# Patient Record
Sex: FEMALE | URBAN - METROPOLITAN AREA
[De-identification: names, ages, dates, MRNs, and addresses within clinical notes are randomized per-mention and may not be internally consistent; named-entity substitution may affect disease eponyms.]

---

## 2020-08-26 ENCOUNTER — EMERGENCY (EMERGENCY)
Facility: HOSPITAL | Age: 40
LOS: 1 days | Discharge: HOME | End: 2020-08-26
Attending: EMERGENCY MEDICINE | Admitting: EMERGENCY MEDICINE
Payer: MEDICAID

## 2020-08-26 VITALS
DIASTOLIC BLOOD PRESSURE: 77 MMHG | TEMPERATURE: 98 F | HEART RATE: 95 BPM | OXYGEN SATURATION: 99 % | RESPIRATION RATE: 18 BRPM | SYSTOLIC BLOOD PRESSURE: 107 MMHG

## 2020-08-26 VITALS
DIASTOLIC BLOOD PRESSURE: 57 MMHG | HEART RATE: 92 BPM | OXYGEN SATURATION: 100 % | SYSTOLIC BLOOD PRESSURE: 105 MMHG | RESPIRATION RATE: 16 BRPM

## 2020-08-26 DIAGNOSIS — Z90.710 ACQUIRED ABSENCE OF BOTH CERVIX AND UTERUS: Chronic | ICD-10-CM

## 2020-08-26 LAB
ALBUMIN SERPL ELPH-MCNC: 4.4 G/DL — SIGNIFICANT CHANGE UP (ref 3.3–5)
ALP SERPL-CCNC: 105 U/L — SIGNIFICANT CHANGE UP (ref 40–120)
ALT FLD-CCNC: 16 U/L — SIGNIFICANT CHANGE UP (ref 4–33)
ANION GAP SERPL CALC-SCNC: 13 MMO/L — SIGNIFICANT CHANGE UP (ref 7–14)
APPEARANCE UR: SIGNIFICANT CHANGE UP
AST SERPL-CCNC: 14 U/L — SIGNIFICANT CHANGE UP (ref 4–32)
BACTERIA # UR AUTO: HIGH
BASOPHILS # BLD AUTO: 0.04 K/UL — SIGNIFICANT CHANGE UP (ref 0–0.2)
BASOPHILS NFR BLD AUTO: 0.3 % — SIGNIFICANT CHANGE UP (ref 0–2)
BILIRUB SERPL-MCNC: 0.6 MG/DL — SIGNIFICANT CHANGE UP (ref 0.2–1.2)
BILIRUB UR-MCNC: NEGATIVE — SIGNIFICANT CHANGE UP
BLOOD UR QL VISUAL: HIGH
BUN SERPL-MCNC: 14 MG/DL — SIGNIFICANT CHANGE UP (ref 7–23)
CALCIUM SERPL-MCNC: 9.3 MG/DL — SIGNIFICANT CHANGE UP (ref 8.4–10.5)
CHLORIDE SERPL-SCNC: 106 MMOL/L — SIGNIFICANT CHANGE UP (ref 98–107)
CO2 SERPL-SCNC: 21 MMOL/L — LOW (ref 22–31)
COLOR SPEC: YELLOW — SIGNIFICANT CHANGE UP
CREAT SERPL-MCNC: 0.88 MG/DL — SIGNIFICANT CHANGE UP (ref 0.5–1.3)
EOSINOPHIL # BLD AUTO: 0.16 K/UL — SIGNIFICANT CHANGE UP (ref 0–0.5)
EOSINOPHIL NFR BLD AUTO: 1.3 % — SIGNIFICANT CHANGE UP (ref 0–6)
EPI CELLS # UR: SIGNIFICANT CHANGE UP
GLUCOSE SERPL-MCNC: 88 MG/DL — SIGNIFICANT CHANGE UP (ref 70–99)
GLUCOSE UR-MCNC: NEGATIVE — SIGNIFICANT CHANGE UP
HCT VFR BLD CALC: 41.8 % — SIGNIFICANT CHANGE UP (ref 34.5–45)
HGB BLD-MCNC: 13.5 G/DL — SIGNIFICANT CHANGE UP (ref 11.5–15.5)
IMM GRANULOCYTES NFR BLD AUTO: 0.2 % — SIGNIFICANT CHANGE UP (ref 0–1.5)
KETONES UR-MCNC: NEGATIVE — SIGNIFICANT CHANGE UP
LEUKOCYTE ESTERASE UR-ACNC: HIGH
LYMPHOCYTES # BLD AUTO: 1.74 K/UL — SIGNIFICANT CHANGE UP (ref 1–3.3)
LYMPHOCYTES # BLD AUTO: 14.1 % — SIGNIFICANT CHANGE UP (ref 13–44)
MAGNESIUM SERPL-MCNC: 2.2 MG/DL — SIGNIFICANT CHANGE UP (ref 1.6–2.6)
MCHC RBC-ENTMCNC: 29.7 PG — SIGNIFICANT CHANGE UP (ref 27–34)
MCHC RBC-ENTMCNC: 32.3 % — SIGNIFICANT CHANGE UP (ref 32–36)
MCV RBC AUTO: 91.9 FL — SIGNIFICANT CHANGE UP (ref 80–100)
MONOCYTES # BLD AUTO: 0.82 K/UL — SIGNIFICANT CHANGE UP (ref 0–0.9)
MONOCYTES NFR BLD AUTO: 6.6 % — SIGNIFICANT CHANGE UP (ref 2–14)
MUCOUS THREADS # UR AUTO: SIGNIFICANT CHANGE UP
NEUTROPHILS # BLD AUTO: 9.56 K/UL — HIGH (ref 1.8–7.4)
NEUTROPHILS NFR BLD AUTO: 77.5 % — HIGH (ref 43–77)
NITRITE UR-MCNC: POSITIVE — SIGNIFICANT CHANGE UP
NRBC # FLD: 0 K/UL — SIGNIFICANT CHANGE UP (ref 0–0)
PH UR: 6 — SIGNIFICANT CHANGE UP (ref 5–8)
PHOSPHATE SERPL-MCNC: 3.4 MG/DL — SIGNIFICANT CHANGE UP (ref 2.5–4.5)
PLATELET # BLD AUTO: 241 K/UL — SIGNIFICANT CHANGE UP (ref 150–400)
PMV BLD: 11.2 FL — SIGNIFICANT CHANGE UP (ref 7–13)
POTASSIUM SERPL-MCNC: 3.8 MMOL/L — SIGNIFICANT CHANGE UP (ref 3.5–5.3)
POTASSIUM SERPL-SCNC: 3.8 MMOL/L — SIGNIFICANT CHANGE UP (ref 3.5–5.3)
PROT SERPL-MCNC: 8.1 G/DL — SIGNIFICANT CHANGE UP (ref 6–8.3)
PROT UR-MCNC: 100 — HIGH
RBC # BLD: 4.55 M/UL — SIGNIFICANT CHANGE UP (ref 3.8–5.2)
RBC # FLD: 12.6 % — SIGNIFICANT CHANGE UP (ref 10.3–14.5)
RBC CASTS # UR COMP ASSIST: >50 — HIGH (ref 0–?)
SODIUM SERPL-SCNC: 140 MMOL/L — SIGNIFICANT CHANGE UP (ref 135–145)
SP GR SPEC: 1.01 — SIGNIFICANT CHANGE UP (ref 1–1.04)
UROBILINOGEN FLD QL: NORMAL — SIGNIFICANT CHANGE UP
WBC # BLD: 12.35 K/UL — HIGH (ref 3.8–10.5)
WBC # FLD AUTO: 12.35 K/UL — HIGH (ref 3.8–10.5)
WBC UR QL: >50 — HIGH (ref 0–?)

## 2020-08-26 PROCEDURE — 99284 EMERGENCY DEPT VISIT MOD MDM: CPT

## 2020-08-26 RX ORDER — CEFTRIAXONE 500 MG/1
1000 INJECTION, POWDER, FOR SOLUTION INTRAMUSCULAR; INTRAVENOUS ONCE
Refills: 0 | Status: COMPLETED | OUTPATIENT
Start: 2020-08-26 | End: 2020-08-26

## 2020-08-26 RX ORDER — CEFPODOXIME PROXETIL 100 MG
1 TABLET ORAL
Qty: 14 | Refills: 0
Start: 2020-08-26 | End: 2020-09-01

## 2020-08-26 RX ADMIN — CEFTRIAXONE 100 MILLIGRAM(S): 500 INJECTION, POWDER, FOR SOLUTION INTRAMUSCULAR; INTRAVENOUS at 19:32

## 2020-08-26 NOTE — ED ADULT NURSE NOTE - OBJECTIVE STATEMENT
Received pt to intake bed 6, A+Ox3, ambulatory, Slovenian speaking,  at bedside for translation. C/O abdominal pain, dysuria starting 4 days ago. Respirations even and unlabored. Lung sounds clear with equal chest rise bilaterally. ABD is soft, tender, non distended with normal active bowel sounds. No complaints of chest pain, headache, nausea, dizziness, vomiting  SOB, fever, chills verbalized. Labs sent, will continue to monitor.

## 2020-08-26 NOTE — ED PROVIDER NOTE - ATTENDING CONTRIBUTION TO CARE
DR. BLOCH, ATTENDING MD-  I performed a face to face bedside interview with patient regarding history of present illness, review of symptoms and past medical history. I completed an independent physical exam.  I have discussed patient's plan of care with the resident.  Patient examined, well appearing NAD HEENT nml heart soundsnml lungs clear, abd soft ntender suprapubic and RLQ, LLQ no CVA tenderness, no edema

## 2020-08-26 NOTE — ED PROVIDER NOTE - CLINICAL SUMMARY MEDICAL DECISION MAKING FREE TEXT BOX
39F s/p hysterectomy 2015 appendectomy 2011 presents to ED for 4 days 6/10 worsening urinary burning + frequency, w/ abd pain, denies any blood in urine, vaginal bleeding, or vaginal discharge. Lower abd tenderness to palpation on physical exam, vitals WNL. Concern for UTI vs complicated UTI, less likely abdominal abscess, pyelonephritis, nephrolithiasis. Ordered CBC, CMP, UA, UC, POC Urine preg for Urinary/GYN concerns, will consider imaging if UA negative.

## 2020-08-26 NOTE — ED PROVIDER NOTE - OBJECTIVE STATEMENT
Faisal  #[id number]  39F s/p hysterectomy 2015 appendectomy 2011 presents to ED for 4 days worsening urinary burning + frequency, having to go every half hour Faisal  #[id number]  39F s/p hysterectomy 2015 appendectomy 2011 presents to ED for 4 days worsening urinary burning + frequency, having to go every half hour. Pt states she also has lower bilateral abdominal pain. Faisal  #[id number]  39F s/p hysterectomy 2015 appendectomy 2011 presents to ED for 4 days 6/10 worsening urinary burning + frequency, having to go every half hour. Pt denies any inviting events, she states her urine has been darker than usual, but denies any blood in urine, vaginal bleeding, or vaginal discharge. Pt states she also has lower bilateral abdominal pain over the past 2 days. Pt denies subjective fever/chills/N/V/D. Tylenol + Ibuprofen provided mild pain relief. She states she has an appointment with her OBGYN tomorrow but came to the ED due to the severity of the pain.

## 2020-08-26 NOTE — ED PROVIDER NOTE - PHYSICAL EXAMINATION
CONSTITUTIONAL: Uncomfortable , in NAD  SKIN: Warm dry, normal skin turgor  HEAD: NCAT  EYES: EOMI, PERRLA, no scleral icterus, conjunctiva pink  ENT: normal pharynx with no erythema or exudates  NECK: Supple; non tender. Full ROM.  CARD: RRR, no murmurs.  RESP: clear to ausculation b/l. No crackles or wheezing.  ABD: soft, surgical scars in lower abdomen, no palpable masses. Moderate tenderness to palpation in RLQ and LLQ. Neg [place] sign.   EXT: no pedal edema, no calf tenderness  PSYCH: Cooperative, appropriate.

## 2020-08-26 NOTE — ED PROVIDER NOTE - PATIENT PORTAL LINK FT
You can access the FollowMyHealth Patient Portal offered by [facility] by registering at the following website: [email or web address]. By joining [employer]’s FollowMyHealth portal, you will also be able to view your health information using other applications (apps) compatible with our system.

## 2020-08-26 NOTE — ED ADULT NURSE NOTE - NSIMPLEMENTINTERV_GEN_ALL_ED
Implemented All Universal Safety Interventions:  [place] to call system. Call bell, personal items and telephone within reach. Instruct patient to call for assistance. Room bathroom lighting operational. Non-slip footwear when patient is off stretcher. Physically safe environment: no spills, clutter or unnecessary equipment. Stretcher in lowest position, wheels locked, appropriate side rails in place.

## 2020-08-26 NOTE — ED PROVIDER NOTE - NSFOLLOWUPINSTRUCTIONS_ED_ALL_ED_FT
You were seen in the ED for pain with urination as well as lower abdominal pain. You were seen by a doctor and had both bloodwork and urine testing performed. You are being sent home with antibiotics, please take them as prescribed, and follow up with your OBGYN tomorrow. You were seen in the ED for pain with urination as well as lower abdominal pain. You were seen by a doctor and had both blood work and urine testing performed. You are being sent home with antibiotics for a Urinary Tract Infection, please take them as prescribed, see medication labels for instructions and warnings. Please follow up with your OBGYN appointment tomorrow. If your fever worsens overnight, or if you start to feel worse, please return to the Emergency Department

## 2020-08-26 NOTE — ED ADULT TRIAGE NOTE - CHIEF COMPLAINT QUOTE
Patient has c/o pain to her right lower abdominal area since 4 days ago. Patient has c/o pain to her right lower abdominal area since 4 days ago. : Usma: [phone]

## 2020-08-26 NOTE — ED PROVIDER NOTE - NS ED ROS FT
Constitutional:  (-) fever, (-) chills  Cardiac: (-) chest pain (-) palpitations  Respiratory:  (-) cough (-) respiratory distress.   GI:  (-) nausea (-) vomiting (-) diarrhea (+) lower bilat abdominal pain.  :  (+) dysuria (+) frequency (+) burning  MS:  (-) back pain (-) joint pain  Neuro:  (-) headache (-) numbness (-) tingling (-) focal weakness  Skin:  (-) rash  Except as documented in the HPI,  all other systems are negative

## 2021-07-03 ENCOUNTER — EMERGENCY (EMERGENCY)
Facility: HOSPITAL | Age: 41
LOS: 1 days | Discharge: HOME | End: 2021-07-03
Attending: EMERGENCY MEDICINE | Admitting: EMERGENCY MEDICINE
Payer: MEDICAID

## 2021-07-03 VITALS
DIASTOLIC BLOOD PRESSURE: 74 MMHG | RESPIRATION RATE: 16 BRPM | OXYGEN SATURATION: 100 % | TEMPERATURE: 98 F | SYSTOLIC BLOOD PRESSURE: 109 MMHG | HEART RATE: 79 BPM

## 2021-07-03 DIAGNOSIS — Z90.710 ACQUIRED ABSENCE OF BOTH CERVIX AND UTERUS: Chronic | ICD-10-CM

## 2021-07-03 PROBLEM — Z78.9 OTHER SPECIFIED HEALTH STATUS: Chronic | Status: ACTIVE | Noted: 2020-08-26

## 2021-07-03 PROCEDURE — 99283 EMERGENCY DEPT VISIT LOW MDM: CPT | Mod: 25

## 2021-07-03 PROCEDURE — 10140 I&D HMTMA SEROMA/FLUID COLLJ: CPT

## 2021-07-03 RX ORDER — ACETAMINOPHEN 500 MG
650 TABLET ORAL ONCE
Refills: 0 | Status: COMPLETED | OUTPATIENT
Start: 2021-07-03 | End: 2021-07-03

## 2021-07-03 RX ADMIN — Medication 650 MILLIGRAM(S): at 13:59

## 2021-07-03 NOTE — ED PROVIDER NOTE - OBJECTIVE STATEMENT
39 y/o f with no pmhx p/w left 1st digit crush injury of distal thumb yesterday. states pain is 5/10. sharp in distal 1st thumb. with subungal hematoma. has rom of all joints in that hand. no other trauma

## 2021-07-03 NOTE — ED PROVIDER NOTE - PATIENT PORTAL LINK FT
You can access the FollowMyHealth Patient Portal offered by [facility] by registering at the following website: [email or web address]. By joining [employer]’s FollowMyHealth portal, you will also be able to view your health information using other applications (apps) compatible with our system.

## 2021-07-03 NOTE — ED PROVIDER NOTE - CLINICAL SUMMARY MEDICAL DECISION MAKING FREE TEXT BOX
pt p/w left 1st digit crush injury of distal finger. full range of motion of all joints. subungal hematoma drained with successful blood and symptomatic improvement.

## 2022-09-22 NOTE — ED PROVIDER NOTE - NS ED MD DISPO DISCHARGE
Monitor summary:     Rhythm : AFIB  Rate :   Ectopy : PVC'S, BIGEMINY'S    FL=.-  QRS=.05  QT=.-        Per telemetry strip summary from monitor room.              Home

## 2023-07-31 NOTE — ED ADULT TRIAGE NOTE - NS ED NURSE BANDS TYPE
Name band; Azathioprine Counseling:  I discussed with the patient the risks of azathioprine including but not limited to myelosuppression, immunosuppression, hepatotoxicity, lymphoma, and infections.  The patient understands that monitoring is required including baseline LFTs, Creatinine, possible TPMP genotyping and weekly CBCs for the first month and then every 2 weeks thereafter.  The patient verbalized understanding of the proper use and possible adverse effects of azathioprine.  All of the patient's questions and concerns were addressed.

## 2024-04-16 PROBLEM — Z00.00 ENCOUNTER FOR PREVENTIVE HEALTH EXAMINATION: Status: ACTIVE | Noted: 2024-04-16

## 2024-04-17 ENCOUNTER — OUTPATIENT (OUTPATIENT)
Dept: OUTPATIENT SERVICES | Facility: HOSPITAL | Age: 44
LOS: 1 days | End: 2024-04-17
Payer: MEDICAID

## 2024-04-17 ENCOUNTER — APPOINTMENT (OUTPATIENT)
Dept: NUCLEAR MEDICINE | Facility: IMAGING CENTER | Age: 44
End: 2024-04-17
Payer: MEDICAID

## 2024-04-17 DIAGNOSIS — Z00.8 ENCOUNTER FOR OTHER GENERAL EXAMINATION: ICD-10-CM

## 2024-04-17 DIAGNOSIS — Z90.710 ACQUIRED ABSENCE OF BOTH CERVIX AND UTERUS: Chronic | ICD-10-CM

## 2024-04-17 PROCEDURE — A9552: CPT

## 2024-04-17 PROCEDURE — 78815 PET IMAGE W/CT SKULL-THIGH: CPT | Mod: 26,PS

## 2024-04-17 PROCEDURE — 78815 PET IMAGE W/CT SKULL-THIGH: CPT

## 2024-11-13 ENCOUNTER — OUTPATIENT (OUTPATIENT)
Dept: OUTPATIENT SERVICES | Facility: HOSPITAL | Age: 44
LOS: 1 days | End: 2024-11-13

## 2024-11-13 VITALS
HEIGHT: 61 IN | WEIGHT: 115.96 LBS | DIASTOLIC BLOOD PRESSURE: 70 MMHG | HEART RATE: 66 BPM | OXYGEN SATURATION: 98 % | TEMPERATURE: 97 F | RESPIRATION RATE: 16 BRPM | SYSTOLIC BLOOD PRESSURE: 104 MMHG

## 2024-11-13 DIAGNOSIS — R59.0 LOCALIZED ENLARGED LYMPH NODES: ICD-10-CM

## 2024-11-13 DIAGNOSIS — Z98.891 HISTORY OF UTERINE SCAR FROM PREVIOUS SURGERY: Chronic | ICD-10-CM

## 2024-11-13 DIAGNOSIS — Z90.710 ACQUIRED ABSENCE OF BOTH CERVIX AND UTERUS: Chronic | ICD-10-CM

## 2024-11-13 PROBLEM — Z78.9 OTHER SPECIFIED HEALTH STATUS: Chronic | Status: INACTIVE | Noted: 2020-08-26 | Resolved: 2024-11-13

## 2024-11-13 NOTE — H&P PST ADULT - NSANTHGENDERRD_ENT_A_CORE
Continued Stay SW/CM Assessment/Plan of Care Note     Active Substitute Decision Maker (SDM)    There are no active Substitute Decision Maker (SDM) on file.       Progress note:  SW following for dc planning. Chart reviewed and discussed in OFT's. Writer received a call from Zan at [facility] (p: [phone] ext: [phone]) who reports that a dialysis chair time has been confirmed.     [facility]   Address: [facility]  [address], [place], [address]  Telephone: [phone]  Fax: [phone]  Chair Time: Tuesday, Thursday, Saturday at 0630  Start Date: Tuesday 12/5/23    Writer to update pt.     SW to follow.     See SW/CM flowsheets for other objective data.    Disposition Recommendations:  SW/CM recommendation for discharge: Home    Discharge Plan/Needs:     Continued Care and Services - Admitted Since 11/20/2023     Dialysis Infusion     Service Provider Request Status Selected Services Address Phone Fax    [facility] Dialysis Admissions Intake Pending - Request Sent N/A -- [phone]    [facility] Dialysis Admissions Intake Pending - Request Sent N/A [address], [place] [address] [phone]              Prior To Hospitalization:    Living Situation: Children, Spouse and residing at House    .  Support Systems: Family members, Spouse   Home Devices/Equipment: Blood glucose monitor, Mobility assist device    Mobility Assist Devices: Four wheel walker   Type of Service Prior to Hospitalization: PT    Patient/Family discharge goal (s):  Home     Therapy Recommendations for Discharge:   PT:      Last Filed Values     None        OT:       Last Filed Values       Value Time User    OT Discharge Needs  does not require ongoing therapy 11/22/2023  9:01 AM Connie Sanchez, OT        SLP:    Last Filed Values     None                   No

## 2024-11-13 NOTE — H&P PST ADULT - PROBLEM SELECTOR PLAN 1
Patient is scheduled for biopsy axillary lymph node with frozen section on 11/18/2024. Pre-op instructions provided. Pt given verbal and written instructions with teach back on chlorhexidine shampoo and Pepcid. Pt verbalized understanding with return demonstration.    recent blood work done at Hematologist for lymph node work up.

## 2024-11-13 NOTE — H&P PST ADULT - HISTORY OF PRESENT ILLNESS
43 yr old female with no significant PMH  presents to PST for preop evaluation. Pt reported, right axillary lymph node noted on mammogram and patient had PET scan showed enlarged left level 2 lymph node, a right supraclavicular lymph node and a right axillary lymph node. Pt denies any symptoms. Patient is scheduled for biopsy axillary lymph node with frozen section on 11/18/2024.

## 2024-11-18 ENCOUNTER — TRANSCRIPTION ENCOUNTER (OUTPATIENT)
Age: 44
End: 2024-11-18

## 2024-11-18 ENCOUNTER — OUTPATIENT (OUTPATIENT)
Dept: OUTPATIENT SERVICES | Facility: HOSPITAL | Age: 44
LOS: 1 days | Discharge: HOME | End: 2024-11-18
Payer: MEDICAID

## 2024-11-18 VITALS
HEIGHT: 61 IN | SYSTOLIC BLOOD PRESSURE: 120 MMHG | OXYGEN SATURATION: 100 % | DIASTOLIC BLOOD PRESSURE: 84 MMHG | TEMPERATURE: 98 F | HEART RATE: 84 BPM | WEIGHT: 115.96 LBS | RESPIRATION RATE: 16 BRPM

## 2024-11-18 VITALS
OXYGEN SATURATION: 100 % | RESPIRATION RATE: 18 BRPM | TEMPERATURE: 98 F | DIASTOLIC BLOOD PRESSURE: 65 MMHG | HEART RATE: 82 BPM | SYSTOLIC BLOOD PRESSURE: 103 MMHG

## 2024-11-18 DIAGNOSIS — Z98.891 HISTORY OF UTERINE SCAR FROM PREVIOUS SURGERY: Chronic | ICD-10-CM

## 2024-11-18 DIAGNOSIS — R59.0 LOCALIZED ENLARGED LYMPH NODES: ICD-10-CM

## 2024-11-18 DIAGNOSIS — Z90.710 ACQUIRED ABSENCE OF BOTH CERVIX AND UTERUS: Chronic | ICD-10-CM

## 2024-11-18 PROCEDURE — 88189 FLOWCYTOMETRY/READ 16 & >: CPT | Mod: 59

## 2024-11-18 PROCEDURE — 88360 TUMOR IMMUNOHISTOCHEM/MANUAL: CPT | Mod: 26,1L,59

## 2024-11-18 PROCEDURE — 88364 INSITU HYBRIDIZATION (FISH): CPT | Mod: 26

## 2024-11-18 PROCEDURE — 88341 IMHCHEM/IMCYTCHM EA ADD ANTB: CPT | Mod: 26

## 2024-11-18 PROCEDURE — 88108 CYTOPATH CONCENTRATE TECH: CPT | Mod: 26,59

## 2024-11-18 PROCEDURE — 88342 IMHCHEM/IMCYTCHM 1ST ANTB: CPT | Mod: 26

## 2024-11-18 PROCEDURE — 88365 INSITU HYBRIDIZATION (FISH): CPT | Mod: 26

## 2024-11-18 PROCEDURE — 88307 TISSUE EXAM BY PATHOLOGIST: CPT | Mod: 26

## 2024-11-18 DEVICE — SURGICEL 2 X 14": Type: IMPLANTABLE DEVICE | Status: FUNCTIONAL

## 2024-11-18 RX ORDER — OXYCODONE HYDROCHLORIDE 30 MG/1
1 TABLET ORAL
Qty: 5 | Refills: 0
Start: 2024-11-18

## 2024-11-18 NOTE — ASU PREOP CHECKLIST - PATIENT SENT TO
Patient calling to possibly reschedule esophageal manometry procedure scheduled on 11/17/23. Patient calling to see is 12/8 or 12/15 are available for procedure. Patient informed that currently those dates are not available. Patient asked what other dates are available in December. Patient informed that currently 12/28 and 12/29 are available. Patient states she will keep appointment as scheduled and will call back it she wants to reschedule.   
operating room

## 2024-11-18 NOTE — ASU DISCHARGE PLAN (ADULT/PEDIATRIC) - CARE PROVIDER_API CALL
Lu Champagne  Surgery  [address] [place] [address]  Phone: [phone]  Fax: [phone]  Follow Up Time: 1 week

## 2024-11-18 NOTE — ASU DISCHARGE PLAN (ADULT/PEDIATRIC) - ASU DC SPECIAL INSTRUCTIONSFT
WOUND CARE: Please do not remove your dressing. The surgical dressing will be removed at follow-up office visit.     BATHING: Please do not submerge wound underwater. You may shower 1 day after surgery.     ACTIVITY: You may return to your usual level of physical activity. If you are taking narcotic pain medication (such as Oxycodone), do NOT drive a car, operate machinery, or make important decisions.     DIET: No changes    PAIN CONTROL: You may use Tylenol for pain control every 6 hours, with oxycodone as needed if pain is not controlled with the Tylenol.     NOTIFY YOUR SURGEON: If you have any bleeding that does not stop, any pus draining from your wound, any fever (over 100.4) or chills, persistent nausea/vomiting with inability to tolerate food or liquids, or if your pain is not controlled on your discharge pain medications.     FOLLOW-UP:   1. Please make a follow-up appointment with Dr. Champagne within 1weeks of discharge.

## 2024-11-18 NOTE — ASU DISCHARGE PLAN (ADULT/PEDIATRIC) - FINANCIAL ASSISTANCE
[facility] provides services at a reduced cost to those who are determined to be eligible through [facility]’s financial assistance program. Information regarding [facility]’s financial assistance program can be found by going to [email or web address] or by calling [phone].

## 2024-11-18 NOTE — ASU PREOP CHECKLIST - NSSDAENDDT_GEN_ALL_CORE
March 4, 2024    Muna Newberry  [address] [place] [address]             [facility]  [address] [place] [address]  Phone: [phone]  Fax: [phone]   March 4, 2024     Patient: Muna Newberry   YOB: 1962   Date of Visit: 3/4/2024       To Whom it May Concern:    Muna Newberry was seen in my clinic on 3/4/2024. She may return to work on 03/05/2024 .    Please excuse her from any classes or work missed.    If you have any questions or concerns, please don't hesitate to call.    Sincerely,         Sharita Briggs, GRETCHENP      18-Nov-2024 08:12

## 2024-11-19 LAB
GRAM STN FLD: SIGNIFICANT CHANGE UP
SPECIMEN SOURCE: SIGNIFICANT CHANGE UP
TM INTERPRETATION: SIGNIFICANT CHANGE UP

## 2024-11-20 LAB — GRAM STN FLD: ABNORMAL

## 2024-11-22 LAB
-  CLINDAMYCIN: SIGNIFICANT CHANGE UP
-  ERYTHROMYCIN: SIGNIFICANT CHANGE UP
-  GENTAMICIN: SIGNIFICANT CHANGE UP
-  OXACILLIN: SIGNIFICANT CHANGE UP
-  PENICILLIN: SIGNIFICANT CHANGE UP
-  RIFAMPIN: SIGNIFICANT CHANGE UP
-  TETRACYCLINE: SIGNIFICANT CHANGE UP
-  TRIMETHOPRIM/SULFAMETHOXAZOLE: SIGNIFICANT CHANGE UP
-  VANCOMYCIN: SIGNIFICANT CHANGE UP
METHOD TYPE: SIGNIFICANT CHANGE UP

## 2024-11-24 LAB
CULTURE RESULTS: ABNORMAL
ORGANISM # SPEC MICROSCOPIC CNT: ABNORMAL
ORGANISM # SPEC MICROSCOPIC CNT: ABNORMAL
SPECIMEN SOURCE: SIGNIFICANT CHANGE UP

## 2024-11-26 LAB — HEMATOPATHOLOGY REPORT: SIGNIFICANT CHANGE UP

## 2024-12-02 LAB — CHROM ANALY OVERALL INTERP SPEC-IMP: SIGNIFICANT CHANGE UP

## 2025-06-08 NOTE — H&P PST ADULT - PRO INTERPRETER NEED 2
[Consultation] : a consultation [TextBox_44] : Cystic fibrosis evaluation [TextBox_13] : MD Trevor Lancaster [TextEntry] : Visit completed in collaboration with Maria A Mendosa, MONSERRATP-BC Faisal

## (undated) DEVICE — SPECIMEN CONTAINER 100ML

## (undated) DEVICE — DRAPE LAPAROTOMY TRANSVERSE

## (undated) DEVICE — SUT SILK 2-0 18" FS

## (undated) DEVICE — ELCTR GROUNDING PAD ADULT COVIDIEN

## (undated) DEVICE — ELCTR BOVIE TIP BLADE INSULATED 4" EDGE

## (undated) DEVICE — NDL HYPO SAFE 25G X 1" (ORANGE)

## (undated) DEVICE — SUT MONOCRYL 3-0 27" PS-2 UNDYED

## (undated) DEVICE — SUT CHROMIC 2-0 36" CT-1

## (undated) DEVICE — SYR ASEPTO

## (undated) DEVICE — RADIOGRAPHY DVC SPEC TRANSPEC

## (undated) DEVICE — DRSG OPSITE 2.5 X 2"

## (undated) DEVICE — LIGASURE SMALL JAW

## (undated) DEVICE — PREP BETADINE KIT

## (undated) DEVICE — DRAPE CHEST BREAST 106" X 122"

## (undated) DEVICE — DRAPE 3/4 SHEET 52X76"

## (undated) DEVICE — DRSG STERISTRIPS 0.5 X 4"

## (undated) DEVICE — SOL IRR POUR H2O 500ML

## (undated) DEVICE — DRAIN RESERVOIR FOR JACKSON PRATT 100CC CARDINAL

## (undated) DEVICE — VENODYNE/SCD SLEEVE CALF MEDIUM

## (undated) DEVICE — DRSG TEGADERM 6 X 8"

## (undated) DEVICE — ELCTR ROCKER SWITCH PENCIL BLUE 10FT

## (undated) DEVICE — DRAIN BLAKE 15FR BARD CHANNEL

## (undated) DEVICE — DRSG CURITY GAUZE SPONGE 4 X 4" 12-PLY

## (undated) DEVICE — DRSG TEGADERM 4 X 4.75"

## (undated) DEVICE — DRAIN JACKSON PRATT 10MM FLAT FULL NO TROCAR

## (undated) DEVICE — SUCTION YANKAUER NO CONTROL VENT

## (undated) DEVICE — BLADE SURGICAL #15 CARBON

## (undated) DEVICE — PACK MINOR NO DRAPE

## (undated) DEVICE — DRAPE MAYO STAND 30"

## (undated) DEVICE — SUT VICRYL 4-0 27" SH UNDYED

## (undated) DEVICE — WARMING BLANKET LOWER ADULT

## (undated) DEVICE — DRAPE 1/2 SHEET 40X57"

## (undated) DEVICE — GOWN XL

## (undated) DEVICE — DRAPE TOWEL BLUE 17" X 24"

## (undated) DEVICE — POSITIONER FOAM EGG CRATE ULNAR 2PCS (PINK)

## (undated) DEVICE — GLV 6.5 PROTEXIS (WHITE)

## (undated) DEVICE — STAPLER SKIN VISI-STAT 35 WIDE

## (undated) DEVICE — DRSG OPSITE 13.75 X 4"

## (undated) DEVICE — DRSG TELFA 3 X 8

## (undated) DEVICE — GLV 7 PROTEXIS (WHITE)

## (undated) DEVICE — MARKING PEN W RULER

## (undated) DEVICE — POSITIONER PATIENT SAFETY STRAP 3X60"

## (undated) DEVICE — DRSG ALLEVYN LIFE 5X5"

## (undated) DEVICE — DRAPE COVER SLEEVE GAMMA FINDER III

## (undated) DEVICE — GOWN LG

## (undated) DEVICE — FOLEY TRAY 16FR 5CC LTX UMETER CLOSED

## (undated) DEVICE — PREP CHLORAPREP HI-LITE ORANGE 26ML

## (undated) DEVICE — DRAPE INSTRUMENT POUCH 6.75" X 11"

## (undated) DEVICE — SOL IRR POUR NS 0.9% 500ML

## (undated) DEVICE — DRSG DERMABOND 0.7ML